# Patient Record
Sex: FEMALE | Race: WHITE | NOT HISPANIC OR LATINO | Employment: OTHER | URBAN - METROPOLITAN AREA
[De-identification: names, ages, dates, MRNs, and addresses within clinical notes are randomized per-mention and may not be internally consistent; named-entity substitution may affect disease eponyms.]

---

## 2020-01-22 ENCOUNTER — TRANSCRIBE ORDERS (OUTPATIENT)
Dept: PHYSICAL THERAPY | Facility: CLINIC | Age: 60
End: 2020-01-22

## 2020-01-22 DIAGNOSIS — M79.7 FIBROMYALGIA: Primary | ICD-10-CM

## 2020-01-22 DIAGNOSIS — M19.90 ARTHRITIS: ICD-10-CM

## 2020-02-05 ENCOUNTER — TREATMENT (OUTPATIENT)
Dept: PHYSICAL THERAPY | Facility: CLINIC | Age: 60
End: 2020-02-05

## 2020-02-05 DIAGNOSIS — M54.2 CERVICAL PAIN: ICD-10-CM

## 2020-02-05 DIAGNOSIS — M17.0 PRIMARY OSTEOARTHRITIS OF BOTH KNEES: ICD-10-CM

## 2020-02-05 DIAGNOSIS — M47.816 SPONDYLOSIS OF LUMBAR REGION WITHOUT MYELOPATHY OR RADICULOPATHY: Primary | ICD-10-CM

## 2020-02-05 DIAGNOSIS — M19.90 ARTHRITIS: ICD-10-CM

## 2020-02-05 DIAGNOSIS — M79.7 FIBROMYALGIA: ICD-10-CM

## 2020-02-05 PROCEDURE — 97162 PT EVAL MOD COMPLEX 30 MIN: CPT | Performed by: PHYSICAL THERAPIST
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
## 2020-02-10 ENCOUNTER — TREATMENT (OUTPATIENT)
Dept: PHYSICAL THERAPY | Facility: CLINIC | Age: 60
End: 2020-02-10

## 2020-02-10 DIAGNOSIS — M47.816 SPONDYLOSIS OF LUMBAR REGION WITHOUT MYELOPATHY OR RADICULOPATHY: Primary | ICD-10-CM

## 2020-02-10 DIAGNOSIS — M54.2 CERVICAL PAIN: ICD-10-CM

## 2020-02-10 DIAGNOSIS — M79.7 FIBROMYALGIA: ICD-10-CM

## 2020-02-10 DIAGNOSIS — M17.0 PRIMARY OSTEOARTHRITIS OF BOTH KNEES: ICD-10-CM

## 2020-02-10 DIAGNOSIS — M19.90 ARTHRITIS: ICD-10-CM

## 2020-02-10 PROCEDURE — 97113 AQUATIC THERAPY/EXERCISES: CPT | Performed by: PHYSICAL THERAPIST
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
## 2020-02-24 ENCOUNTER — TREATMENT (OUTPATIENT)
Dept: PHYSICAL THERAPY | Facility: CLINIC | Age: 60
End: 2020-02-24

## 2020-02-24 DIAGNOSIS — M17.0 PRIMARY OSTEOARTHRITIS OF BOTH KNEES: ICD-10-CM

## 2020-02-24 DIAGNOSIS — M47.816 SPONDYLOSIS OF LUMBAR REGION WITHOUT MYELOPATHY OR RADICULOPATHY: Primary | ICD-10-CM

## 2020-02-24 DIAGNOSIS — M54.2 CERVICAL PAIN: ICD-10-CM

## 2020-02-24 DIAGNOSIS — M19.90 ARTHRITIS: ICD-10-CM

## 2020-02-24 DIAGNOSIS — M79.7 FIBROMYALGIA: ICD-10-CM

## 2020-02-24 PROCEDURE — 97113 AQUATIC THERAPY/EXERCISES: CPT | Performed by: PHYSICAL THERAPIST

## 2020-02-24 NOTE — PROGRESS NOTES
Physical Therapy Daily Progress Note    Patient: Ava Cho   : 1960  Diagnosis/ICD-10 Code:  Spondylosis of lumbar region without myelopathy or radiculopathy [M47.816]  Referring practitioner: POOJA Bernard  Date of Initial Visit:   Type: THERAPY  Noted: 2020  Today's Date: 2020  Patient seen for 3 sessions             Subjective   Pain /10, it’s raining.  My knee has been bothering me more since stepping onto the dance floor about 4 days ago while on vacation.      Objective     AQUATICS LE    Water Walk  fwd/bwd/side x 2-3 laps ea  Stretch 1  HS x 30 sec followed by hip sweeps x 10, SN  Stretch 2  piriformis 30 sec x 2  Stretch 3  calf 30 sec x 2   Stretch Other 1 -  Stretch Other 2 -  Vertical Traction LN x 3 min  Abdominals   LN x 12  Clams   suspended at rail x 12  Hip Abd/Add  x 12  Hip Flex/Ext  -  March in Place x 15  Mini Squat  x 15, gluteal squeeze at top  Toe/Heel Raises 15/15  Uni-Squat  leg press vs blue aqua ring x 12  Uni-Clock  hip circles cw/ccw, 10/10  Step Ups  -  Bicycle  seated x 2-3 min  Flutter/Scissor seated, - /15  Exercise 1  HS curls x 12  Exercise 2  -  Exercise 3  -      Assessment/Plan  Patient with c/o increased knee pain and reports some recent issues with her foster child adding to personal stress.  Continued with aquatic ex as previous increasing reps on some ans added suspended clams and HS curls this visit.  Cuing provided for optimal posture, relaxing shoulders, core/glut activation, and proper form/technique with ex.      Plan:  Continue aquatic therapy working on mobility, flexibility, and strength/stabilization per patient tolerance.  Consider adding UE/core ex, step ups, suspended tuck ups, or possibly step back lunges.    Progress per Plan of Care and Progress strengthening /stabilization /functional activity           Timed:  Aquatic Therapy    33     mins 36703;    Teena Randhawa, PT  Physical Therapist

## 2020-02-26 ENCOUNTER — TREATMENT (OUTPATIENT)
Dept: PHYSICAL THERAPY | Facility: CLINIC | Age: 60
End: 2020-02-26

## 2020-02-26 DIAGNOSIS — M17.0 PRIMARY OSTEOARTHRITIS OF BOTH KNEES: ICD-10-CM

## 2020-02-26 DIAGNOSIS — M19.90 ARTHRITIS: ICD-10-CM

## 2020-02-26 DIAGNOSIS — M79.7 FIBROMYALGIA: ICD-10-CM

## 2020-02-26 DIAGNOSIS — M47.816 SPONDYLOSIS OF LUMBAR REGION WITHOUT MYELOPATHY OR RADICULOPATHY: Primary | ICD-10-CM

## 2020-02-26 DIAGNOSIS — M54.2 CERVICAL PAIN: ICD-10-CM

## 2020-02-26 PROCEDURE — 97113 AQUATIC THERAPY/EXERCISES: CPT | Performed by: PHYSICAL THERAPIST

## 2020-02-26 NOTE — PROGRESS NOTES
Physical Therapy Daily Progress Note    Patient: Ava Cho   : 1960  Diagnosis/ICD-10 Code:  Spondylosis of lumbar region without myelopathy or radiculopathy [M47.816]  Referring practitioner: POOJA Bernard  Date of Initial Visit:   Type: THERAPY  Noted: 2020  Today's Date: 2020  Patient seen for 4 sessions             Subjective   Knee still hurts, pain about 5/10 but I’m not limping like I was last week.  Yesterday my back bothered me a little bit.      Objective     AQUATICS LE    Water Walk  fwd/bwd/side x 2 laps ea  Stretch 1  HS x 30 sec followed by hip sweeps x 10, SN  Stretch 2  piriformis 30 sec x 2  Stretch 3  calf 30 sec x 2  Stretch Other 1 -  Stretch Other 2 -  Vertical Traction LN x 3 min  Abdominals   LN x 12  Clams   suspended at rail x 12  Hip Abd/Add  x 15  Hip Flex/Ext  -  March in Place x 15  Mini Squat  x 15, gluteal squeeze at top  Toe/Heel Raises 15/15  Uni-Squat  leg press vs blue aqua ring x 15  Uni-Clock  hip circles cw/ccw, 10/10  Step Ups  x 10 fwd (bottom pool step)  Bicycle  seated x 2-3 min  Flutter/Scissor - / 15, seated  Exercise 1  HS curls x 15  Exercise 2  L3 paddles:  rows B and alt x 12, stirs cw/ccw, 10/10  Exercise 3  -      Assessment/Plan  Patient continues to report moderate pain but not limping as much today.  Performed aquatic exercises as previous increasing reps on a few and added some UE/core work and step ups this visit.  Moderate cuing provided throughout session for posture, core/glut activation, and proper form/technique with exercise.     Plan:  Continue aquatic therapy for decompression with ex/activity.   Consider changing to suspended bicycle/scissor kicks, adding resistance on some LE ex, possibly trying March walking, step back lunges, and/or suspended tuck ups.     Progress per Plan of Care and Progress strengthening /stabilization /functional activity           Timed:  Aquatic Therapy    36     mins 28214;    Teena Randhawa,  PT  Physical Therapist

## 2020-03-02 ENCOUNTER — TREATMENT (OUTPATIENT)
Dept: PHYSICAL THERAPY | Facility: CLINIC | Age: 60
End: 2020-03-02

## 2020-03-02 DIAGNOSIS — M17.0 PRIMARY OSTEOARTHRITIS OF BOTH KNEES: ICD-10-CM

## 2020-03-02 DIAGNOSIS — M54.2 CERVICAL PAIN: ICD-10-CM

## 2020-03-02 DIAGNOSIS — M47.816 SPONDYLOSIS OF LUMBAR REGION WITHOUT MYELOPATHY OR RADICULOPATHY: Primary | ICD-10-CM

## 2020-03-02 DIAGNOSIS — M19.90 ARTHRITIS: ICD-10-CM

## 2020-03-02 DIAGNOSIS — M79.7 FIBROMYALGIA: ICD-10-CM

## 2020-03-02 PROCEDURE — 97113 AQUATIC THERAPY/EXERCISES: CPT | Performed by: PHYSICAL THERAPIST

## 2020-03-02 NOTE — PROGRESS NOTES
Physical Therapy Daily Progress Note    Patient: Ava Cho   : 1960  Diagnosis/ICD-10 Code:  Spondylosis of lumbar region without myelopathy or radiculopathy [M47.816]  Referring practitioner: POOJA Bernard  Date of Initial Visit:   Type: THERAPY  Noted: 2020  Today's Date: 3/2/2020  Patient seen for 5 sessions             Subjective   Feel like the water therapy is definitely helping.  I worked in the yard some and it didn't seem to hurt as much as it usually does.      Objective     AQUATICS LE    Water Walk  fwd/bwd/side x 2 laps ea  Stretch 1  HS x 30 sec followed by hip sweeps x 10, SN  Stretch 2  piriformis 30 sec x 2  Stretch 3  calf 30 sec x 2  Stretch Other 1 -  Stretch Other 2 suspended tuck ups x 10, emphasis on core activation   Vertical Traction LN x 3 min  Abdominals   LN x 15  Clams   suspended at rail x 15  Hip Abd/Add  x 15  Hip Flex/Ext  -  March in Place x 15, also March walking x 1 lap  Mini Squat  x 15, gluteal squeeze at top  Toe/Heel Raises 15/15  Uni-Squat  leg press vs blue aqua ring x 15  Uni-Clock  hip circles cw/ccw, 10/10  Step Ups  x 10, fwd (bottom pool step)  Bicycle   suspended x 3 min  Flutter/Scissor  - / 15, suspended  Exercise 1  HS curls x 15  Exercise 2  L3 paddles:  rows B and alt x 15 ea, stirs cw/ccw, 10/10  Exercise 3  -      Assessment/Plan  Patient feels like aquatic therapy is helping noting ability to work in yard without it bothering her as much.  Continued with aquatic ex as previous increasing reps on a couple, changing to suspended bicycle and scissor kicks, and added March walking and suspended tuck ups this visit.    Plan:   Continue with aquatic therapy for decompression with ex/activity.  Consider adding ankle weights with LE ex, possibly step back lunge, SLS clock, and more UE/core ex.      Progress per Plan of Care and Progress strengthening /stabilization /functional activity           Timed:  Aquatic Therapy    43     mins [id number];    Teena  HEIDY Randhawa, PT  Physical Therapist

## 2020-03-04 ENCOUNTER — TREATMENT (OUTPATIENT)
Dept: PHYSICAL THERAPY | Facility: CLINIC | Age: 60
End: 2020-03-04

## 2020-03-04 DIAGNOSIS — M19.90 ARTHRITIS: ICD-10-CM

## 2020-03-04 DIAGNOSIS — M54.2 CERVICAL PAIN: ICD-10-CM

## 2020-03-04 DIAGNOSIS — M17.0 PRIMARY OSTEOARTHRITIS OF BOTH KNEES: ICD-10-CM

## 2020-03-04 DIAGNOSIS — M47.816 SPONDYLOSIS OF LUMBAR REGION WITHOUT MYELOPATHY OR RADICULOPATHY: Primary | ICD-10-CM

## 2020-03-04 DIAGNOSIS — M79.7 FIBROMYALGIA: ICD-10-CM

## 2020-03-04 PROCEDURE — 97113 AQUATIC THERAPY/EXERCISES: CPT | Performed by: PHYSICAL THERAPIST

## 2020-03-04 NOTE — PROGRESS NOTES
Physical Therapy Daily Progress Note    Patient: Ava Cho   : 1960  Diagnosis/ICD-10 Code:  Spondylosis of lumbar region without myelopathy or radiculopathy [M47.816]  Referring practitioner: POOJA Bernard  Date of Initial Visit:   Type: THERAPY  Noted: 2020  Today's Date: 3/4/2020  Patient seen for 6 sessions             Subjective   Doing good.  Yesterday I walked 5 miles - started hurting a little by the time I was done but used heating pad and not any worse today.       Objective     AQUATICS LE    Water Walk  fwd/bwd/side x 2 laps ea  Stretch 1  HS x 30 sec followed by hip sweeps x 10, SN  Stretch 2  piriformis 30 sec x 2  Stretch 3  calf 30 sec x 2  Stretch Other 1 -  Stretch Other 2 suspended tuck ups at rail x 12, emphasis on core activation   Vertical Traction LN x 3 min  Abdominals   LN x 15  Clams   suspended at rail x 15  Hip Abd/Add  x 15  Hip Flex/Ext  -  March in Place walking x 2 laps  Mini Squat  x 15, gluteal squeeze at top   Toe/Heel Raises 15/15  Uni-Squat  leg press vs blue aqua ring x 15  Uni-Clock  hip circles cw/ccw, 10/10  Step Ups  fwd x 10 (bottom pool step)  Bicycle  suspended x 3 min  Flutter/Scissor - / 15 suspended   Exercise 1  L5 paddles:  rows B and alt x 15 ea, stirs cw/ccw, 10/10  Exercise 2  UE alt flex/ext and horizontal abduction x 12 ea  Exercise 3  -      Assessment/Plan  Patient reports her pain is not constant like it was and she has been able to do more activity prior to increased symptoms.  Performed aquatic ex as previous increasing reps and/or resistance on a few and added a couple of new UE/core ex without issue during session.      Plan:  Continue progressing aquatic ex/activity working on postural/core/LE strength and stabilization.  Consider adding resistance with LE ex and possibly SLS clock, step back lunges. Recertification next visit.     Progress per Plan of Care and Progress strengthening /stabilization /functional activity            Timed:  Aquatic Therapy    45     mins 24050;    Teena Randhawa, PT  Physical Therapist

## 2020-03-09 ENCOUNTER — TREATMENT (OUTPATIENT)
Dept: PHYSICAL THERAPY | Facility: CLINIC | Age: 60
End: 2020-03-09

## 2020-03-09 DIAGNOSIS — M19.90 ARTHRITIS: ICD-10-CM

## 2020-03-09 DIAGNOSIS — M54.2 CERVICAL PAIN: ICD-10-CM

## 2020-03-09 DIAGNOSIS — M79.7 FIBROMYALGIA: ICD-10-CM

## 2020-03-09 DIAGNOSIS — M17.0 PRIMARY OSTEOARTHRITIS OF BOTH KNEES: ICD-10-CM

## 2020-03-09 DIAGNOSIS — M47.816 SPONDYLOSIS OF LUMBAR REGION WITHOUT MYELOPATHY OR RADICULOPATHY: Primary | ICD-10-CM

## 2020-03-09 PROCEDURE — PTNOCHG PR CUSTOM PT NO CHARGE VISIT: Performed by: PHYSICAL THERAPIST

## 2020-03-27 ENCOUNTER — DOCUMENTATION (OUTPATIENT)
Dept: PHYSICAL THERAPY | Facility: CLINIC | Age: 60
End: 2020-03-27

## 2020-03-27 DIAGNOSIS — M47.816 SPONDYLOSIS OF LUMBAR REGION WITHOUT MYELOPATHY OR RADICULOPATHY: Primary | ICD-10-CM

## 2020-03-27 DIAGNOSIS — M54.2 CERVICAL PAIN: ICD-10-CM

## 2020-03-27 DIAGNOSIS — M17.0 PRIMARY OSTEOARTHRITIS OF BOTH KNEES: ICD-10-CM

## 2020-03-27 DIAGNOSIS — M19.90 ARTHRITIS: ICD-10-CM

## 2020-03-27 DIAGNOSIS — M79.7 FIBROMYALGIA: ICD-10-CM

## 2020-03-27 NOTE — PROGRESS NOTES
Discharge Summary  Discharge Summary from Physical Therapy Report      Dates  PT visit: 02/05/2020 to 03/04/2020  Number of Visits: 6     Discharge Status of Patient: Patient attended evaluation and 5 aquatic therapy sessions prior to cancelling her remaining appts. Secondary to coronavirus pandemic. Will discharge her chart at this time.    Goals: Partially Met     Goals  Plan Goals: STGs 4 weeks:  1. Pt to follow aquatic ex routine indep with teach back - Partially Met  2. Pt to noted improved back, neck, knee pain 8-12 hours after aquatic ex - Partially Met - pain no longer constant and has been able to walk more / increase activity prior to onset of pain  3. PSFS score to improve from 57 to < 47% - Not Met - not reassessed as of last visit attended  LTGs 8-12 weeks:  1. Pt to progress to land therapy and HEP if warranted after completing aquatic PT - Not Met - only aquatic therapy prior to discontinuation of therapy   2. Pt to improve lumbar flexion from 45 to 65 degrees with mild discomfort - Not Met - not reassessed as of last visit attended  3. PSFS score to improve to < 35% thus ADLs of lifting, stairs and making jewelry all done with much more ease. - Not Met - not reassessed as of last visit attended    Discharge Plan: Continue with current home exercise program as instructed  Future need for rehabilitation activities    Date of Discharge 03/27/2020        Teena Randhawa, PT  Physical Therapist